# Patient Record
Sex: MALE | Race: WHITE | NOT HISPANIC OR LATINO | URBAN - METROPOLITAN AREA
[De-identification: names, ages, dates, MRNs, and addresses within clinical notes are randomized per-mention and may not be internally consistent; named-entity substitution may affect disease eponyms.]

---

## 2017-07-12 ENCOUNTER — INPATIENT HOSPITAL (OUTPATIENT)
Dept: URBAN - METROPOLITAN AREA HOSPITAL 93 | Facility: HOSPITAL | Age: 77
End: 2017-07-12
Payer: MEDICARE

## 2017-07-12 ENCOUNTER — ON CAMPUS - OUTPATIENT (OUTPATIENT)
Dept: URBAN - METROPOLITAN AREA HOSPITAL 94 | Facility: HOSPITAL | Age: 77
End: 2017-07-12
Payer: MEDICARE

## 2017-07-12 DIAGNOSIS — Z79.01 LONG TERM (CURRENT) USE OF ANTICOAGULANTS: ICD-10-CM

## 2017-07-12 DIAGNOSIS — D50.9 IRON DEFICIENCY ANEMIA, UNSPECIFIED: ICD-10-CM

## 2017-07-12 DIAGNOSIS — I25.9 CHRONIC ISCHEMIC HEART DISEASE, UNSPECIFIED: ICD-10-CM

## 2017-07-12 DIAGNOSIS — K92.1 MELENA: ICD-10-CM

## 2017-07-12 DIAGNOSIS — D50.0 IRON DEFICIENCY ANEMIA SECONDARY TO BLOOD LOSS (CHRONIC): ICD-10-CM

## 2017-07-12 PROCEDURE — 99215 OFFICE O/P EST HI 40 MIN: CPT | Mod: 25 | Performed by: INTERNAL MEDICINE

## 2017-07-12 PROCEDURE — 43235 EGD DIAGNOSTIC BRUSH WASH: CPT | Performed by: INTERNAL MEDICINE

## 2017-07-12 PROCEDURE — 99223 1ST HOSP IP/OBS HIGH 75: CPT | Mod: 25 | Performed by: INTERNAL MEDICINE

## 2017-07-13 ENCOUNTER — INPATIENT HOSPITAL (OUTPATIENT)
Dept: URBAN - METROPOLITAN AREA HOSPITAL 93 | Facility: HOSPITAL | Age: 77
End: 2017-07-13
Payer: MEDICARE

## 2017-07-13 ENCOUNTER — INPATIENT HOSPITAL (OUTPATIENT)
Dept: URBAN - METROPOLITAN AREA HOSPITAL 93 | Facility: HOSPITAL | Age: 77
End: 2017-07-13

## 2017-07-13 PROCEDURE — 99232 SBSQ HOSP IP/OBS MODERATE 35: CPT | Performed by: INTERNAL MEDICINE

## 2017-07-13 PROCEDURE — 99225: CPT | Performed by: INTERNAL MEDICINE

## 2017-07-14 ENCOUNTER — ON CAMPUS - OUTPATIENT (OUTPATIENT)
Dept: URBAN - METROPOLITAN AREA HOSPITAL 94 | Facility: HOSPITAL | Age: 77
End: 2017-07-14

## 2017-07-14 DIAGNOSIS — D50.0 IRON DEFICIENCY ANEMIA SECONDARY TO BLOOD LOSS (CHRONIC): ICD-10-CM

## 2017-07-14 DIAGNOSIS — K92.1 MELENA: ICD-10-CM

## 2017-07-14 DIAGNOSIS — I25.9 CHRONIC ISCHEMIC HEART DISEASE, UNSPECIFIED: ICD-10-CM

## 2017-07-14 DIAGNOSIS — Z79.01 LONG TERM (CURRENT) USE OF ANTICOAGULANTS: ICD-10-CM

## 2017-07-14 PROCEDURE — 45378 DIAGNOSTIC COLONOSCOPY: CPT | Performed by: INTERNAL MEDICINE

## 2017-07-20 ENCOUNTER — OFFICE (OUTPATIENT)
Dept: URBAN - METROPOLITAN AREA CLINIC 10 | Facility: CLINIC | Age: 77
End: 2017-07-20

## 2017-07-20 VITALS
HEART RATE: 85 BPM | HEIGHT: 64 IN | WEIGHT: 142 LBS | DIASTOLIC BLOOD PRESSURE: 57 MMHG | SYSTOLIC BLOOD PRESSURE: 129 MMHG

## 2017-07-20 DIAGNOSIS — I10 ESSENTIAL (PRIMARY) HYPERTENSION: ICD-10-CM

## 2017-07-20 DIAGNOSIS — D50.9 IRON DEFICIENCY ANEMIA, UNSPECIFIED: ICD-10-CM

## 2017-07-20 LAB
CBC COMPLETE BLOOD COUNT W/O DIFF: HEMATOCRIT: 29.7 % — LOW (ref 39–55)
CBC COMPLETE BLOOD COUNT W/O DIFF: HEMOGLOBIN: 9.3 G/DL — LOW (ref 13–17.5)
CBC COMPLETE BLOOD COUNT W/O DIFF: MCH: 28.3 PG (ref 25–35)
CBC COMPLETE BLOOD COUNT W/O DIFF: MCHC: 31.3 % (ref 30–38)
CBC COMPLETE BLOOD COUNT W/O DIFF: MCV: 90.3 FL (ref 78–102)
CBC COMPLETE BLOOD COUNT W/O DIFF: PLATELET COUNT: 500 K/UL — HIGH (ref 150–450)
CBC COMPLETE BLOOD COUNT W/O DIFF: RBC DISTRIBUTION WIDTH: 14.4 % (ref 11.5–16)
CBC COMPLETE BLOOD COUNT W/O DIFF: RED BLOOD CELL COUNT: 3.29 M/UL — LOW (ref 4.3–5.7)
CBC COMPLETE BLOOD COUNT W/O DIFF: WHITE BLOOD CELL COUNT: 9.1 K/UL (ref 4–11)
FERRITIN: 65 NG/ML (ref 30–400)
IRON & TIBC: % SATURATION: 11 % — LOW (ref 20–50)
IRON & TIBC: IRON: 37 UG/DL — LOW (ref 59–158)
IRON & TIBC: TOTAL IRON BINDING: 334 UG/DL (ref 112–347)

## 2017-07-20 PROCEDURE — 99212 OFFICE O/P EST SF 10 MIN: CPT | Performed by: INTERNAL MEDICINE

## 2017-07-20 NOTE — SERVICENOTES
Given his poor health will plan patency capsule as capsule retention would likely portent worse prognosis

## 2017-07-20 NOTE — SERVICEHPINOTES
Mr. Hunt is 77 years old. He is here in consultation with Dr. Mota for chief complaint of anemia. He has had to be hospitalized and required 2 u blood transfusion. He describes having blood with wiping after his colonoscopy but has stopped. He describes dark stools that began prior to taking oral iron replacement. He denies previous abdominal surgeries. He is taking eliquis after having coronary stenting in May of 2017. He has a history of having laryngeal surgery. He describes trouble with swallowing solids that he will have to have to drink something to help go down. He states this is intermittent and normally does not have trouble swallowing big pills. He does admit to hoarseness since his EGD. He is on continuous O2 that has been since his stenting. He was on it only at night prior. He is on 3L continuously. 7/2017–EGD by Dr. Watkins for iron deficiency: Normal-Colonoscopy for iron deficiency anemia: Normal colon and normal terminal ileum. Quality of prep was excellent.6/2017–review of records from Dr. Mota: BR-Hematocrit 35, MCV 93, platelets 278. . Creatinine 1.8. FOBT positive ×3. Ferritin 19. Iron saturation 11%.

## 2017-08-07 ENCOUNTER — OFFICE (OUTPATIENT)
Dept: URBAN - METROPOLITAN AREA CLINIC 10 | Facility: CLINIC | Age: 77
End: 2017-08-07

## 2017-08-07 DIAGNOSIS — D64.9 ANEMIA, UNSPECIFIED: ICD-10-CM

## 2017-08-07 PROCEDURE — 91299 UNLISTED DX GI PROCEDURE: CPT | Performed by: INTERNAL MEDICINE

## 2017-08-18 ENCOUNTER — OFFICE (OUTPATIENT)
Dept: URBAN - METROPOLITAN AREA CLINIC 10 | Facility: CLINIC | Age: 77
End: 2017-08-18

## 2017-08-18 DIAGNOSIS — D50.9 IRON DEFICIENCY ANEMIA, UNSPECIFIED: ICD-10-CM

## 2017-08-18 PROCEDURE — 91110 GI TRC IMG INTRAL ESOPH-ILE: CPT | Performed by: INTERNAL MEDICINE

## 2017-10-02 ENCOUNTER — OFFICE (OUTPATIENT)
Dept: URBAN - METROPOLITAN AREA CLINIC 10 | Facility: CLINIC | Age: 77
End: 2017-10-02

## 2018-01-10 ENCOUNTER — OFFICE (OUTPATIENT)
Dept: URBAN - METROPOLITAN AREA CLINIC 10 | Facility: CLINIC | Age: 78
End: 2018-01-10

## 2018-01-10 VITALS
DIASTOLIC BLOOD PRESSURE: 65 MMHG | HEART RATE: 67 BPM | HEIGHT: 64 IN | WEIGHT: 137 LBS | SYSTOLIC BLOOD PRESSURE: 138 MMHG

## 2018-01-10 DIAGNOSIS — K55.20 ANGIODYSPLASIA OF COLON WITHOUT HEMORRHAGE: ICD-10-CM

## 2018-01-10 DIAGNOSIS — D50.9 IRON DEFICIENCY ANEMIA, UNSPECIFIED: ICD-10-CM

## 2018-01-10 LAB
CBC, PLATELET, NO DIFFERENTIAL: HEMATOCRIT: 37.8 % (ref 37.5–51)
CBC, PLATELET, NO DIFFERENTIAL: HEMOGLOBIN: 12 G/DL — LOW (ref 13–17.7)
CBC, PLATELET, NO DIFFERENTIAL: MCH: 30.6 PG (ref 26.6–33)
CBC, PLATELET, NO DIFFERENTIAL: MCHC: 31.7 G/DL (ref 31.5–35.7)
CBC, PLATELET, NO DIFFERENTIAL: MCV: 96 FL (ref 79–97)
CBC, PLATELET, NO DIFFERENTIAL: PLATELETS: 276 X10E3/UL (ref 150–379)
CBC, PLATELET, NO DIFFERENTIAL: RBC: 3.92 X10E6/UL — LOW (ref 4.14–5.8)
CBC, PLATELET, NO DIFFERENTIAL: RDW: 15.4 % (ref 12.3–15.4)
CBC, PLATELET, NO DIFFERENTIAL: WBC: 7 X10E3/UL (ref 3.4–10.8)
FE+TIBC+FER: FERRITIN, SERUM: 72 NG/ML (ref 30–400)
FE+TIBC+FER: IRON BIND.CAP.(TIBC): 317 UG/DL (ref 250–450)
FE+TIBC+FER: IRON SATURATION: 35 % (ref 15–55)
FE+TIBC+FER: IRON, SERUM: 112 UG/DL (ref 38–169)
FE+TIBC+FER: UIBC: 205 UG/DL (ref 111–343)

## 2018-01-10 PROCEDURE — 99213 OFFICE O/P EST LOW 20 MIN: CPT | Performed by: INTERNAL MEDICINE

## 2018-07-10 ENCOUNTER — OFFICE (OUTPATIENT)
Dept: URBAN - METROPOLITAN AREA CLINIC 10 | Facility: CLINIC | Age: 78
End: 2018-07-10

## 2018-07-10 VITALS
SYSTOLIC BLOOD PRESSURE: 142 MMHG | HEART RATE: 70 BPM | HEIGHT: 64 IN | WEIGHT: 135 LBS | DIASTOLIC BLOOD PRESSURE: 73 MMHG

## 2018-07-10 DIAGNOSIS — D50.9 IRON DEFICIENCY ANEMIA, UNSPECIFIED: ICD-10-CM

## 2018-07-10 LAB
CBC, PLATELET, NO DIFFERENTIAL: HEMATOCRIT: 37.9 % (ref 37.5–51)
CBC, PLATELET, NO DIFFERENTIAL: HEMOGLOBIN: 12.2 G/DL — LOW (ref 13–17.7)
CBC, PLATELET, NO DIFFERENTIAL: MCH: 32 PG (ref 26.6–33)
CBC, PLATELET, NO DIFFERENTIAL: MCHC: 32.2 G/DL (ref 31.5–35.7)
CBC, PLATELET, NO DIFFERENTIAL: MCV: 100 FL — HIGH (ref 79–97)
CBC, PLATELET, NO DIFFERENTIAL: PLATELETS: 302 X10E3/UL (ref 150–379)
CBC, PLATELET, NO DIFFERENTIAL: RBC: 3.81 X10E6/UL — LOW (ref 4.14–5.8)
CBC, PLATELET, NO DIFFERENTIAL: RDW: 14.1 % (ref 12.3–15.4)
CBC, PLATELET, NO DIFFERENTIAL: WBC: 8.1 X10E3/UL (ref 3.4–10.8)
FE+TIBC+FER: FERRITIN, SERUM: 111 NG/ML (ref 30–400)
FE+TIBC+FER: IRON BIND.CAP.(TIBC): 318 UG/DL (ref 250–450)
FE+TIBC+FER: IRON SATURATION: 24 % (ref 15–55)
FE+TIBC+FER: IRON: 77 UG/DL (ref 38–169)
FE+TIBC+FER: UIBC: 241 UG/DL (ref 111–343)

## 2018-07-10 PROCEDURE — 99213 OFFICE O/P EST LOW 20 MIN: CPT | Performed by: INTERNAL MEDICINE

## 2018-07-13 ENCOUNTER — OFFICE (OUTPATIENT)
Dept: URBAN - METROPOLITAN AREA CLINIC 10 | Facility: CLINIC | Age: 78
End: 2018-07-13

## 2018-07-19 ENCOUNTER — OFFICE (OUTPATIENT)
Dept: URBAN - METROPOLITAN AREA CLINIC 10 | Facility: CLINIC | Age: 78
End: 2018-07-19

## 2018-07-30 ENCOUNTER — OFFICE (OUTPATIENT)
Dept: URBAN - METROPOLITAN AREA CLINIC 10 | Facility: CLINIC | Age: 78
End: 2018-07-30

## 2019-01-14 ENCOUNTER — OFFICE (OUTPATIENT)
Dept: URBAN - METROPOLITAN AREA CLINIC 10 | Facility: CLINIC | Age: 79
End: 2019-01-14

## 2019-01-14 VITALS
WEIGHT: 132 LBS | SYSTOLIC BLOOD PRESSURE: 139 MMHG | HEIGHT: 64 IN | DIASTOLIC BLOOD PRESSURE: 78 MMHG | HEART RATE: 83 BPM

## 2019-01-14 DIAGNOSIS — D50.9 IRON DEFICIENCY ANEMIA, UNSPECIFIED: ICD-10-CM

## 2019-01-14 DIAGNOSIS — D75.89 OTHER SPECIFIED DISEASES OF BLOOD AND BLOOD-FORMING ORGANS: ICD-10-CM

## 2019-01-14 LAB
CBC, PLATELET, NO DIFFERENTIAL: HEMATOCRIT: 36.6 % — LOW (ref 37.5–51)
CBC, PLATELET, NO DIFFERENTIAL: HEMOGLOBIN: 11.8 G/DL — LOW (ref 13–17.7)
CBC, PLATELET, NO DIFFERENTIAL: MCH: 32.2 PG (ref 26.6–33)
CBC, PLATELET, NO DIFFERENTIAL: MCHC: 32.2 G/DL (ref 31.5–35.7)
CBC, PLATELET, NO DIFFERENTIAL: MCV: 100 FL — HIGH (ref 79–97)
CBC, PLATELET, NO DIFFERENTIAL: PLATELETS: 307 X10E3/UL (ref 150–379)
CBC, PLATELET, NO DIFFERENTIAL: RBC: 3.66 X10E6/UL — LOW (ref 4.14–5.8)
CBC, PLATELET, NO DIFFERENTIAL: RDW: 13.8 % (ref 12.3–15.4)
CBC, PLATELET, NO DIFFERENTIAL: WBC: 7.5 X10E3/UL (ref 3.4–10.8)
FE+TIBC+FER: FERRITIN, SERUM: 91 NG/ML (ref 30–400)
FE+TIBC+FER: IRON BIND.CAP.(TIBC): (no result)
FE+TIBC+FER: IRON SATURATION: (no result)
FE+TIBC+FER: IRON: 91 UG/DL (ref 38–169)
FE+TIBC+FER: UIBC: (no result)

## 2019-01-14 PROCEDURE — 99213 OFFICE O/P EST LOW 20 MIN: CPT | Performed by: INTERNAL MEDICINE
